# Patient Record
Sex: FEMALE | Race: WHITE | NOT HISPANIC OR LATINO | Employment: OTHER | URBAN - METROPOLITAN AREA
[De-identification: names, ages, dates, MRNs, and addresses within clinical notes are randomized per-mention and may not be internally consistent; named-entity substitution may affect disease eponyms.]

---

## 2017-12-23 ENCOUNTER — APPOINTMENT (OUTPATIENT)
Dept: RADIOLOGY | Facility: CLINIC | Age: 37
End: 2017-12-23
Payer: COMMERCIAL

## 2017-12-23 ENCOUNTER — GENERIC CONVERSION - ENCOUNTER (OUTPATIENT)
Dept: OTHER | Facility: OTHER | Age: 37
End: 2017-12-23

## 2017-12-23 ENCOUNTER — TRANSCRIBE ORDERS (OUTPATIENT)
Dept: URGENT CARE | Facility: CLINIC | Age: 37
End: 2017-12-23

## 2017-12-23 DIAGNOSIS — M25.531 PAIN IN RIGHT WRIST: ICD-10-CM

## 2017-12-23 PROCEDURE — 73110 X-RAY EXAM OF WRIST: CPT

## 2017-12-23 PROCEDURE — 73130 X-RAY EXAM OF HAND: CPT

## 2018-01-24 VITALS
DIASTOLIC BLOOD PRESSURE: 70 MMHG | HEART RATE: 82 BPM | RESPIRATION RATE: 18 BRPM | OXYGEN SATURATION: 99 % | SYSTOLIC BLOOD PRESSURE: 122 MMHG

## 2024-01-23 ENCOUNTER — HOSPITAL ENCOUNTER (EMERGENCY)
Facility: HOSPITAL | Age: 44
Discharge: HOME | End: 2024-01-23
Attending: EMERGENCY MEDICINE
Payer: OTHER MISCELLANEOUS

## 2024-01-23 VITALS
SYSTOLIC BLOOD PRESSURE: 165 MMHG | TEMPERATURE: 97.6 F | DIASTOLIC BLOOD PRESSURE: 85 MMHG | BODY MASS INDEX: 22.2 KG/M2 | OXYGEN SATURATION: 100 % | RESPIRATION RATE: 18 BRPM | WEIGHT: 130 LBS | HEIGHT: 64 IN | HEART RATE: 83 BPM

## 2024-01-23 DIAGNOSIS — M25.511 RIGHT SHOULDER PAIN: ICD-10-CM

## 2024-01-23 DIAGNOSIS — S09.90XA INJURY OF HEAD, INITIAL ENCOUNTER: Primary | ICD-10-CM
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
## 2024-01-23 PROCEDURE — 99284 EMERGENCY DEPT VISIT MOD MDM: CPT | Performed by: EMERGENCY MEDICINE

## 2024-01-23 PROCEDURE — 99282 EMERGENCY DEPT VISIT SF MDM: CPT

## 2024-01-23 PROCEDURE — 96372 THER/PROPH/DIAG INJ SC/IM: CPT

## 2024-01-23 RX ORDER — LIDOCAINE 50 MG/G
1 PATCH TOPICAL DAILY
Qty: 15 PATCH | Refills: 0 | Status: SHIPPED | OUTPATIENT
Start: 2024-01-23

## 2024-01-23 RX ORDER — METHOCARBAMOL 500 MG/1
500 TABLET, FILM COATED ORAL ONCE
Status: COMPLETED | OUTPATIENT
Start: 2024-01-23 | End: 2024-01-23

## 2024-01-23 RX ORDER — KETOROLAC TROMETHAMINE 30 MG/ML
15 INJECTION, SOLUTION INTRAMUSCULAR; INTRAVENOUS ONCE
Status: COMPLETED | OUTPATIENT
Start: 2024-01-23 | End: 2024-01-23

## 2024-01-23 RX ORDER — NAPROXEN 500 MG/1
500 TABLET ORAL 2 TIMES DAILY WITH MEALS
Qty: 30 TABLET | Refills: 0 | Status: SHIPPED | OUTPATIENT
Start: 2024-01-23

## 2024-01-23 RX ORDER — LIDOCAINE 50 MG/G
1 PATCH TOPICAL ONCE
Status: DISCONTINUED | OUTPATIENT
Start: 2024-01-23 | End: 2024-01-23 | Stop reason: HOSPADM

## 2024-01-23 RX ORDER — ACETAMINOPHEN 325 MG/1
975 TABLET ORAL ONCE
Status: COMPLETED | OUTPATIENT
Start: 2024-01-23 | End: 2024-01-23

## 2024-01-23 RX ORDER — METHOCARBAMOL 500 MG/1
500 TABLET, FILM COATED ORAL 2 TIMES DAILY
Qty: 20 TABLET | Refills: 0 | Status: SHIPPED | OUTPATIENT
Start: 2024-01-23

## 2024-01-23 RX ADMIN — ACETAMINOPHEN 975 MG: 325 TABLET ORAL at 12:36

## 2024-01-23 RX ADMIN — LIDOCAINE 1 PATCH: 50 PATCH TOPICAL at 12:40

## 2024-01-23 RX ADMIN — KETOROLAC TROMETHAMINE 15 MG: 30 INJECTION, SOLUTION INTRAMUSCULAR at 12:38

## 2024-01-23 RX ADMIN — METHOCARBAMOL 500 MG: 500 TABLET ORAL at 12:38

## 2024-01-23 NOTE — DISCHARGE INSTRUCTIONS
If you have any severe worsening of headache, confusion, vision changes, persistent nausea and vomiting, return to the emergency department.  If you have any severe worsening of pain to your right shoulder, weakness or numbness to her right arm, we also recommend that she return to the emergency department.  We have provided information to follow-up with [facility] occupational medicine since this is a workplace injury.  Use the prescribed Naprosyn, Lidoderm patches for pain, you can safely use Tylenol with this as well.  We have additionally prescribed Robaxin for muscle spasm.

## 2024-01-23 NOTE — Clinical Note
Ángel Hines was seen and treated in our emergency department on 1/23/2024.                Diagnosis:     Ángel  .    She may return on this date: 01/25/2024         If you have any questions or concerns, please don't hesitate to call.      Abhay Schaefer MD    ______________________________           _______________          _______________  Hospital Representative                              Date                                Time

## 2024-01-23 NOTE — ED PROVIDER NOTES
History  Chief Complaint   Patient presents with    Head Injury     Tripped over a kid at work and hit head on a wood door, no LOC, no thinners     HPI  Patient is a 43-year-old otherwise healthy female presenting for evaluation of right-sided pain after fall.  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  Patient complains of a throbbing pain in the right side of her head radiating down the side of her neck and into her shoulder.  Patient feels some pain in the subscapular area on the right side.  Patient denies loss of consciousness, nausea, vomiting, focal weakness or numbness.  Patient denies anticoagulation or antiplatelet use.  None       History reviewed. No pertinent past medical history.    History reviewed. No pertinent surgical history.    History reviewed. No pertinent family history.  I have reviewed and agree with the history as documented.    E-Cigarette/Vaping     E-Cigarette/Vaping Substances     Social History     Tobacco Use    Smoking status: Never    Smokeless tobacco: Never       Review of Systems   Constitutional:  Negative for chills, fatigue and fever.   Respiratory:  Negative for shortness of breath.    Cardiovascular:  Negative for chest pain.   Gastrointestinal:  Negative for diarrhea, nausea and vomiting.   Musculoskeletal:  Positive for arthralgias (Right shoulder). Negative for myalgias.   Neurological:  Positive for headaches. Negative for weakness and numbness.   Psychiatric/Behavioral:  Negative for confusion.    All other systems reviewed and are negative.      Physical Exam  Physical Exam  Vitals and nursing note reviewed.   Constitutional:       General: She is not in acute distress.     Appearance: Normal appearance. She is not ill-appearing, toxic-appearing or diaphoretic.      Comments: Well-appearing, nontoxic,  nondistressed   HENT:      Head: Normocephalic and atraumatic.      Comments: No external signs of trauma.  No scalp hematoma.  Pupils 3 mm bilaterally, reactive to light, normal EOM.       Right Ear: External ear normal.      Left Ear: External ear normal.   Eyes:      General:         Right eye: No discharge.         Left eye: No discharge.   Cardiovascular:      Comments: Regular rate and rhythm, no murmurs rubs or gallops.  Extremities warm and well-perfused without mottling.  Pulmonary:      Effort: No respiratory distress.      Comments: No increased work of breathing.  Speaking in complete sentences.  Lungs clear to auscultation bilaterally without wheezes, rales, rhonchi.  Satting 100% on room air indicating adequate oxygenation.  Abdominal:      General: There is no distension.   Musculoskeletal:         General: No deformity.      Cervical back: Normal range of motion.   Skin:     Findings: No lesion or rash.   Neurological:      Mental Status: She is alert and oriented to person, place, and time. Mental status is at baseline.      Comments: Cranial nerves II through XII intact.  Full strength and sensation bilaterally in the upper and lower extremities.  Normal finger-nose.   Psychiatric:         Mood and Affect: Mood and affect normal.         Vital Signs  ED Triage Vitals [01/23/24 1156]   Temperature Pulse Respirations Blood Pressure SpO2   97.6 °F (36.4 °C) 83 18 165/85 100 %      Temp src Heart Rate Source Patient Position - Orthostatic VS BP Location FiO2 (%)   -- -- -- -- --      Pain Score       8           Vitals:    01/23/24 1156   BP: 165/85   Pulse: 83         Visual Acuity      ED Medications  Medications   ketorolac (TORADOL) injection 15 mg (has no administration in time range)   lidocaine (LIDODERM) 5 % patch 1 patch (has no administration in time range)   acetaminophen (TYLENOL) tablet 975 mg (has no administration in time range)   methocarbamol (ROBAXIN) tablet 500 mg (has no  administration in time range)       Diagnostic Studies  Results Reviewed       None                   No orders to display              Procedures  Procedures         ED Course                               SBIRT 22yo+      Flowsheet Row Most Recent Value   Initial Alcohol Screen: US AUDIT-C     1. How often do you have a drink containing alcohol? 0 Filed at: 01/23/2024 1158   2. How many drinks containing alcohol do you have on a typical day you are drinking?  0 Filed at: 01/23/2024 1158   3a. Male UNDER 65: How often do you have five or more drinks on one occasion? 0 Filed at: 01/23/2024 1158   3b. FEMALE Any Age, or MALE 65+: How often do you have 4 or more drinks on one occassion? 0 Filed at: 01/23/2024 1158   Audit-C Score 0 Filed at: 01/23/2024 1156   WEI: How many times in the past year have you...    Used an illegal drug or used a prescription medication for non-medical reasons? Never Filed at: 01/23/2024 1151                      Medical Decision Making  I obtained history from the patient.  Patient presenting with a fall from standing.  Patient with no external signs of trauma.  Patient without significant bony tenderness of the right upper extremity.  Patient with no midline cervical tenderness.  Cervical spine cleared clinically Via Nexus criteria.  Given patient's age, normal mentation, lack of anticoagulation or antiplatelet use, nonfocal neuroexam, head CT deferred at this time.  Patient treated with Tylenol, Toradol, Robaxin, Lidoderm patch for symptomatic pain management.  Provided with reassurance, [facility] follow-up given that this is a workplace injury, discharged with return precautions.    Risk  OTC drugs.  Prescription drug management.             Disposition  Final diagnoses:   Injury of head, initial encounter   Fall from standing, initial encounter   Right shoulder pain     Time reflects when diagnosis was documented in both MDM as applicable and the Disposition within this note        Time User Action Codes Description Comment    1/23/2024 12:23 PM Abhay Schaefer Add [S09.90XA] Injury of head, initial encounter     1/23/2024 12:23 PM Abhay Schaefer [W19.XXXA] Fall from standing, initial encounter     1/23/2024 12:23 PM Abhay Schaefer Add [M25.511] Right shoulder pain           ED Disposition       ED Disposition   Discharge    Condition   Stable    Date/Time   Tue Jan 23, 2024 1223    Comment   Ángel Hines discharge to home/self care.                   Follow-up Information       Follow up With Specialties Details Why Contact Info Additional Information    [facility] Emergency Department Emergency Medicine  If symptoms worsen [address] [facility] [address]  [phone] [facility], [address] [place] [address], [id number]    [facility] Occupational Medicine [place] [address]  [phone]             Patient's Medications   Discharge Prescriptions    LIDOCAINE (LIDODERM) 5 %    Apply 1 patch topically over 12 hours daily Remove & Discard patch within 12 hours or as directed by MD       Start Date: 1/23/2024 End Date: --       Order Dose: 1 patch       Quantity: 15 patch    Refills: 0    METHOCARBAMOL (ROBAXIN) 500 MG TABLET    Take 1 tablet (500 mg total) by mouth 2 (two) times a day       Start Date: 1/23/2024 End Date: --       Order Dose: 500 mg       Quantity: 20 tablet    Refills: 0    NAPROXEN (NAPROSYN) 500 MG TABLET    Take 1 tablet (500 mg total) by mouth 2 (two) times a day with meals       Start Date: 1/23/2024 End Date: --       Order Dose: 500 mg       Quantity: 30 tablet    Refills: 0       No discharge procedures on file.    PDMP Review       None            ED Provider  Electronically Signed by             Abhay Schaefer MD  01/23/24 5817

## 2024-01-24 ENCOUNTER — APPOINTMENT (OUTPATIENT)
Dept: RADIOLOGY | Facility: CLINIC | Age: 44
End: 2024-01-24
Payer: OTHER MISCELLANEOUS

## 2024-01-24 ENCOUNTER — OCCMED (OUTPATIENT)
Dept: URGENT CARE | Facility: CLINIC | Age: 44
End: 2024-01-24
Payer: OTHER MISCELLANEOUS

## 2024-01-24 DIAGNOSIS — M54.2 NECK PAIN: ICD-10-CM

## 2024-01-24 DIAGNOSIS — M54.6 ACUTE RIGHT-SIDED THORACIC BACK PAIN: Primary | ICD-10-CM

## 2024-01-24 DIAGNOSIS — M54.6 ACUTE RIGHT-SIDED THORACIC BACK PAIN: ICD-10-CM

## 2024-01-24 PROCEDURE — 72040 X-RAY EXAM NECK SPINE 2-3 VW: CPT

## 2024-01-24 PROCEDURE — 99203 OFFICE O/P NEW LOW 30 MIN: CPT | Performed by: PHYSICIAN ASSISTANT

## 2024-01-24 PROCEDURE — 73030 X-RAY EXAM OF SHOULDER: CPT

## 2024-01-24 PROCEDURE — 71101 X-RAY EXAM UNILAT RIBS/CHEST: CPT

## 2024-01-31 ENCOUNTER — OCCMED (OUTPATIENT)
Dept: URGENT CARE | Facility: CLINIC | Age: 44
End: 2024-01-31
Payer: OTHER MISCELLANEOUS

## 2024-01-31 DIAGNOSIS — M54.2 NECK PAIN: Primary | ICD-10-CM

## 2024-01-31 DIAGNOSIS — M54.6 ACUTE RIGHT-SIDED THORACIC BACK PAIN: ICD-10-CM

## 2024-01-31 PROCEDURE — 99213 OFFICE O/P EST LOW 20 MIN: CPT | Performed by: STUDENT IN AN ORGANIZED HEALTH CARE EDUCATION/TRAINING PROGRAM
